# Patient Record
Sex: FEMALE | Race: WHITE | NOT HISPANIC OR LATINO | Employment: PART TIME | URBAN - METROPOLITAN AREA
[De-identification: names, ages, dates, MRNs, and addresses within clinical notes are randomized per-mention and may not be internally consistent; named-entity substitution may affect disease eponyms.]

---

## 2022-09-29 ENCOUNTER — ANNUAL EXAM (OUTPATIENT)
Dept: OBGYN CLINIC | Facility: CLINIC | Age: 62
End: 2022-09-29

## 2022-09-29 VITALS
BODY MASS INDEX: 27.82 KG/M2 | WEIGHT: 138 LBS | DIASTOLIC BLOOD PRESSURE: 72 MMHG | HEIGHT: 59 IN | SYSTOLIC BLOOD PRESSURE: 120 MMHG

## 2022-09-29 DIAGNOSIS — Z01.419 ENCNTR FOR GYN EXAM (GENERAL) (ROUTINE) W/O ABN FINDINGS: Primary | ICD-10-CM

## 2022-09-29 DIAGNOSIS — Z12.31 ENCOUNTER FOR SCREENING MAMMOGRAM FOR MALIGNANT NEOPLASM OF BREAST: ICD-10-CM

## 2022-09-29 RX ORDER — ATORVASTATIN CALCIUM 10 MG/1
TABLET, FILM COATED ORAL
COMMUNITY
Start: 2022-09-28

## 2022-09-29 NOTE — PROGRESS NOTES
Annual Wellness Visit  [address], [address], [facility], [address] [facility]    ASSESSMENT/PLAN: Valeriy Garcia is a 63 y o   who presents for annual gynecologic exam     Encounter for routine gynecologic examination  - Routine well woman exam completed today  - Cervical Cancer Screening: Current ASCCP Guidelines reviewed  Last Pap: 2019  Next Pap Due: , routine   - Contraceptive counseling discussed  Current contraception: no method, postmenopausal  - Breast Cancer Screening: Last Mammogram Not on file, done 2022  - Colorectal cancer screening last   - The following were reviewed in today's visit: breast self exam    Additional problems addressed during this visit:  1  Encntr for gyn exam (general) (routine) w/o abn findings    2  Encounter for screening mammogram for malignant neoplasm of breast  -     Mammo screening bilateral w 3d & cad; Future        Next visit: 1 yr      CC:  Annual Gynecologic Examination    HPI: Valeriy Garcia is a 63 y o   who presents for annual gynecologic examination  Patient presents for Gyn exam   Denies having any concerns  Gyn History  No LMP recorded  Patient is postmenopausal      Last Pap: 2019 was normal    She  has no history on file for sexual activity         OB History      Past Medical History:  No date: Breast calcifications  No date: Hyperlipidemia     Past Surgical History:  No date:  SECTION      Comment:  x2  No date: COLONOSCOPY      Comment:  [place] 2018  No date: KIDNEY SURGERY      Comment:  Removal     Family History   Problem Relation Age of Onset    Glaucoma Father     Crohn's disease Son         Social History     Tobacco Use    Smoking status: Never Smoker    Smokeless tobacco: Never Used   Vaping Use    Vaping Use: Never used          Current Outpatient Medications:     atorvastatin (LIPITOR) 10 mg tablet, , Disp: , Rfl:     Elderberry 575 MG/5ML SYRP, Every 12 hours, Disp: , Rfl:   Flax Oil-Fish Oil-Borage Oil (FISH OIL-FLAX OIL-BORAGE OIL PO), every 24 hours, Disp: , Rfl:     Lutein-Zeaxanthin 15-4 75 MG CAPS, every 24 hours, Disp: , Rfl:     ZINC GLUCONATE ER PO, , Disp: , Rfl:     She is allergic to cocoa butter [albolene], sulfa antibiotics, and sulfamethoxazole-trimethoprim       ROS negative except as noted in HPI    Objective:  /72 (BP Location: Left arm, Patient Position: Sitting, Cuff Size: Standard)   Ht 4' 11 34" (1 507 m)   Wt 62 6 kg (138 lb)   BMI 27 55 kg/m²      Physical Exam

## 2022-11-09 DIAGNOSIS — Z12.31 ENCOUNTER FOR SCREENING MAMMOGRAM FOR MALIGNANT NEOPLASM OF BREAST: ICD-10-CM

## 2023-10-27 ENCOUNTER — TELEPHONE (OUTPATIENT)
Age: 63
End: 2023-10-27

## 2024-02-08 ENCOUNTER — ANNUAL EXAM (OUTPATIENT)
Dept: OBGYN CLINIC | Facility: CLINIC | Age: 64
End: 2024-02-08
Payer: COMMERCIAL

## 2024-02-08 VITALS
DIASTOLIC BLOOD PRESSURE: 72 MMHG | WEIGHT: 135 LBS | SYSTOLIC BLOOD PRESSURE: 132 MMHG | BODY MASS INDEX: 27.21 KG/M2 | HEIGHT: 59 IN

## 2024-02-08 DIAGNOSIS — Z12.31 ENCOUNTER FOR SCREENING MAMMOGRAM FOR MALIGNANT NEOPLASM OF BREAST: ICD-10-CM

## 2024-02-08 DIAGNOSIS — Z12.4 SCREENING FOR CERVICAL CANCER: ICD-10-CM

## 2024-02-08 DIAGNOSIS — Z01.419 ENCNTR FOR GYN EXAM (GENERAL) (ROUTINE) W/O ABN FINDINGS: Primary | ICD-10-CM

## 2024-02-08 PROCEDURE — 99396 PREV VISIT EST AGE 40-64: CPT | Performed by: OBSTETRICS & GYNECOLOGY

## 2024-02-08 RX ORDER — BERBERINE CHLOR/SEAWEED/CHROM 500-250 MG
CAPSULE ORAL
COMMUNITY

## 2024-02-08 RX ORDER — KRILL/OM-3/DHA/EPA/PHOSPHO/AST 500-110 MG
CAPSULE ORAL
COMMUNITY

## 2024-02-08 NOTE — PROGRESS NOTES
Annual Wellness Visit  [facility] - [address], [address], [place] [address]    ASSESSMENT/PLAN: Kimberly Rios is a 63 y.o.  who presents for annual gynecologic exam.    Encounter for routine gynecologic examination  - Routine well woman exam completed today.  - Cervical Cancer Screening: Current ASCCP Guidelines reviewed. Last Pap: 2019. Next Pap Due: today, routine.  - Contraceptive counseling discussed.  Current contraception: no method, postmenopausal  - Breast Cancer Screening: Last Mammogram 10/20/2022  - Colorectal cancer screening last    - The following were reviewed in today's visit: breast self exam    Additional problems addressed during this visit:  1. Encntr for gyn exam (general) (routine) w/o abn findings  -     Thinprep Tis Pap and HPV mRNA E6/E7 Reflex HPV 16,18/45    2. Encounter for screening mammogram for malignant neoplasm of breast  -     Mammo screening bilateral w 3d & cad; Future    3. Screening for cervical cancer  -     Thinprep Tis Pap and HPV mRNA E6/E7 Reflex HPV 16,18/45    Next visit: 1 yr    CC:  Annual Gynecologic Examination    HPI: Kimberly Rios is a 63 y.o.  who presents for annual gynecologic examination.  Patient presents for Gyn exam.  Denies having any concerns        Gyn History  No LMP recorded. Patient is postmenopausal.     Last Pap: 2019 was normal    She  reports being sexually active and has had partner(s) who are male. She reports using the following method of birth control/protection: Post-menopausal.       OB History      Past Medical History:  No date: Breast calcifications  No date: Hyperlipidemia     Past Surgical History:  No date:  SECTION      Comment:  x2  No date: COLONOSCOPY      Comment:  [place] 2018  No date: KIDNEY SURGERY      Comment:  Removal     Family History   Problem Relation Age of Onset    Hyperlipidemia Mother     Hypertension Mother     Skin cancer Mother     Glaucoma Father      "Hyperlipidemia Father     Hypertension Father     Crohn's disease Son         Social History     Tobacco Use    Smoking status: Never     Passive exposure: Never    Smokeless tobacco: Never   Vaping Use    Vaping status: Never Used   Substance Use Topics    Alcohol use: Yes    Drug use: Never          Current Outpatient Medications:     Elderberry 575 MG/5ML SYRP, Every 12 hours, Disp: , Rfl:     Flax Oil-Fish Oil-Borage Oil (FISH OIL-FLAX OIL-BORAGE OIL PO), every 24 hours, Disp: , Rfl:     Krill Oil (Omega-3) 500 MG CAPS, as directed Orally, Disp: , Rfl:     Lutein-Zeaxanthin 15-4.75 MG CAPS, every 24 hours, Disp: , Rfl:     Red Yeast Rice 55 MG CAPS, Take by mouth, Disp: , Rfl:     ZINC GLUCONATE ER PO, , Disp: , Rfl:     atorvastatin (LIPITOR) 10 mg tablet, , Disp: , Rfl:     She is allergic to cocoa butter [albolene], sulfa antibiotics, sulfamethoxazole-trimethoprim, and sunflower oil - food allergy..    ROS negative except as noted in HPI    Objective:  /72 (BP Location: Left arm, Patient Position: Sitting, Cuff Size: Standard)   Ht 4' 11\" (1.499 m)   Wt 61.2 kg (135 lb)   BMI 27.27 kg/m²      Physical Exam  Vitals and nursing note reviewed.   HENT:      Head: Normocephalic.   Chest:   Breasts:     Breasts are symmetrical.      Right: Normal. No bleeding, mass, nipple discharge, skin change or tenderness.      Left: Normal. No bleeding, mass, nipple discharge, skin change or tenderness.   Abdominal:      General: There is no distension.      Palpations: Abdomen is soft. There is no mass.      Tenderness: There is no abdominal tenderness. There is no rebound.   Genitourinary:     General: Normal vulva.      Exam position: Lithotomy position.      Labia:         Right: No rash, tenderness or lesion.         Left: No rash, tenderness or lesion.       Urethra: No urethral pain or urethral lesion.      Vagina: Normal. No vaginal discharge.      Cervix: No discharge, friability, lesion or erythema.      " Uterus: Normal.       Adnexa: Right adnexa normal and left adnexa normal.      Rectum: No external hemorrhoid.   Musculoskeletal:      Right lower leg: No edema.      Left lower leg: No edema.   Lymphadenopathy:      Upper Body:      Right upper body: No axillary or pectoral adenopathy.      Left upper body: No axillary or pectoral adenopathy.   Skin:     General: Skin is warm.   Neurological:      Mental Status: She is alert and oriented to person, place, and time.   Psychiatric:         Mood and Affect: Mood normal.         Behavior: Behavior normal.         Thought Content: Thought content normal.

## 2024-02-12 LAB
CLINICAL INFO: NORMAL
CYTO CVX: NORMAL
DATE PREVIOUS BX: NORMAL
HPV E6+E7 MRNA CVX QL NAA+PROBE: NOT DETECTED
LMP START DATE: NORMAL
SL AMB PREV. PAP:: NORMAL
SPECIMEN SOURCE CVX/VAG CYTO: NORMAL
STAT OF ADQ CVX/VAG CYTO-IMP: NORMAL

## 2025-01-28 ENCOUNTER — TELEPHONE (OUTPATIENT)
Age: 65
End: 2025-01-28

## 2025-01-28 DIAGNOSIS — Z12.31 ENCOUNTER FOR SCREENING MAMMOGRAM FOR MALIGNANT NEOPLASM OF BREAST: Primary | ICD-10-CM

## 2025-01-28 NOTE — TELEPHONE ENCOUNTER
Patient  has not scheduled mammogram order.  Order expires 2/2025.  Patient needs order updated and then faxed to [place].  Fax # [phone]

## 2025-02-21 ENCOUNTER — RESULTS FOLLOW-UP (OUTPATIENT)
Dept: OBGYN CLINIC | Facility: CLINIC | Age: 65
End: 2025-02-21

## 2025-02-21 DIAGNOSIS — Z12.31 ENCOUNTER FOR SCREENING MAMMOGRAM FOR MALIGNANT NEOPLASM OF BREAST: ICD-10-CM

## 2025-02-24 ENCOUNTER — ANNUAL EXAM (OUTPATIENT)
Dept: OBGYN CLINIC | Facility: CLINIC | Age: 65
End: 2025-02-24
Payer: COMMERCIAL

## 2025-02-24 VITALS
HEIGHT: 59 IN | SYSTOLIC BLOOD PRESSURE: 120 MMHG | WEIGHT: 132 LBS | BODY MASS INDEX: 26.61 KG/M2 | DIASTOLIC BLOOD PRESSURE: 60 MMHG

## 2025-02-24 DIAGNOSIS — Z01.419 ENCNTR FOR GYN EXAM (GENERAL) (ROUTINE) W/O ABN FINDINGS: Primary | ICD-10-CM

## 2025-02-24 DIAGNOSIS — Z12.31 ENCOUNTER FOR SCREENING MAMMOGRAM FOR MALIGNANT NEOPLASM OF BREAST: ICD-10-CM

## 2025-02-24 PROCEDURE — S0612 ANNUAL GYNECOLOGICAL EXAMINA: HCPCS | Performed by: OBSTETRICS & GYNECOLOGY

## 2025-02-24 NOTE — PROGRESS NOTES
Annual Wellness Visit  [facility] - [address], [address], [place] [address]    ASSESSMENT/PLAN: Kimberly Rios is a 64 y.o.  who presents for annual gynecologic exam.    Encounter for routine gynecologic examination  - Routine well woman exam completed today.  - Cervical Cancer Screening: Current ASCCP Guidelines reviewed. Last Pap: 2024.  Next Pap Due: 5 yrs.  - STI screening offered including HIV testing: offered, pt declined  - Contraceptive counseling discussed.  Current contraception: no method, postmenopausal  - Breast Cancer Screening: Last Mammogram 2025, order provided  - The following were reviewed in today's visit: breast self exam and mammography screening ordered    Additional problems addressed during this visit:  1. Encntr for gyn exam (general) (routine) w/o abn findings  2. Encounter for screening mammogram for malignant neoplasm of breast  -     Mammo screening bilateral w 3d and cad; Future      Next visit: 1 yr [place]      CC:  Annual Gynecologic Examination    HPI: Kimberly Rios is a 64 y.o.  who presents for annual gynecologic examination.  Pt presents for Gyn wellness exam.  Denies having any concerns    Gynecologic Exam        Gyn History  No LMP recorded. Patient is postmenopausal.     Last Pap: 2024 was normal    She  reports being sexually active and has had partner(s) who are male. She reports using the following method of birth control/protection: Post-menopausal.       OB History      Past Medical History:  No date: Breast calcifications  No date: Hyperlipidemia     Past Surgical History:  No date:  SECTION      Comment:  x2  No date: COLONOSCOPY      Comment:  [place] 2018  No date: KIDNEY SURGERY      Comment:  Removal     Family History   Problem Relation Age of Onset    Hyperlipidemia Mother     Hypertension Mother     Skin cancer Mother     Glaucoma Father     Hyperlipidemia Father     Hypertension Father     Crohn's disease  "Son         Social History     Tobacco Use    Smoking status: Never     Passive exposure: Never    Smokeless tobacco: Never   Vaping Use    Vaping status: Never Used   Substance Use Topics    Alcohol use: Yes    Drug use: Never          Current Outpatient Medications:     Elderberry 575 MG/5ML SYRP, Every 12 hours, Disp: , Rfl:     Flax Oil-Fish Oil-Borage Oil (FISH OIL-FLAX OIL-BORAGE OIL PO), every 24 hours, Disp: , Rfl:     Krill Oil (Omega-3) 500 MG CAPS, as directed Orally, Disp: , Rfl:     Lutein-Zeaxanthin 15-4.75 MG CAPS, every 24 hours, Disp: , Rfl:     Red Yeast Rice 55 MG CAPS, Take by mouth, Disp: , Rfl:     ZINC GLUCONATE ER PO, , Disp: , Rfl:     atorvastatin (LIPITOR) 10 mg tablet, , Disp: , Rfl:     She is allergic to sulfa antibiotics, sulfamethoxazole-trimethoprim, and sunflower oil - food allergy..    ROS negative except as noted in HPI    Objective:  /60 (BP Location: Right arm, Patient Position: Sitting, Cuff Size: Standard)   Ht 4' 11\" (1.499 m)   Wt 59.9 kg (132 lb)   BMI 26.66 kg/m²      Physical Exam  Vitals and nursing note reviewed.   HENT:      Head: Normocephalic.   Chest:   Breasts:     Breasts are symmetrical.      Right: Normal. No bleeding, mass, nipple discharge, skin change or tenderness.      Left: Normal. No bleeding, mass, nipple discharge, skin change or tenderness.   Abdominal:      General: There is no distension.      Palpations: Abdomen is soft. There is no mass.      Tenderness: There is no abdominal tenderness. There is no rebound.   Genitourinary:     General: Normal vulva.      Exam position: Lithotomy position.      Labia:         Right: No rash, tenderness or lesion.         Left: No rash, tenderness or lesion.       Urethra: No urethral pain or urethral lesion.      Vagina: Normal. No vaginal discharge.      Cervix: No discharge, friability, lesion or erythema.      Uterus: Normal.       Adnexa: Right adnexa normal and left adnexa normal.      Rectum: No " external hemorrhoid.      Comments: Tight introitus, atrophic vaginal mucosa  Musculoskeletal:      Right lower leg: No edema.      Left lower leg: No edema.   Lymphadenopathy:      Upper Body:      Right upper body: No axillary or pectoral adenopathy.      Left upper body: No axillary or pectoral adenopathy.   Skin:     General: Skin is warm.   Neurological:      Mental Status: She is alert and oriented to person, place, and time.   Psychiatric:         Mood and Affect: Mood normal.         Behavior: Behavior normal.         Thought Content: Thought content normal.